# Patient Record
Sex: FEMALE | Employment: UNEMPLOYED
[De-identification: names, ages, dates, MRNs, and addresses within clinical notes are randomized per-mention and may not be internally consistent; named-entity substitution may affect disease eponyms.]

---

## 2023-08-15 ENCOUNTER — HOSPITAL ENCOUNTER (OUTPATIENT)
Facility: HOSPITAL | Age: 33
Discharge: HOME | End: 2023-08-18
Attending: ORTHOPAEDIC SURGERY
Payer: OTHER GOVERNMENT

## 2023-08-15 VITALS — WEIGHT: 180 LBS

## 2023-08-15 DIAGNOSIS — S89.92XA: ICD-10-CM

## 2023-08-15 DIAGNOSIS — S83.242A ACUTE MEDIAL MENISCUS TEAR OF LEFT KNEE, INITIAL ENCOUNTER: ICD-10-CM

## 2023-08-15 DIAGNOSIS — M25.562 ACUTE PAIN OF LEFT KNEE: ICD-10-CM

## 2023-08-15 PROCEDURE — 73721 MRI JNT OF LWR EXTRE W/O DYE: CPT

## 2024-05-09 ENCOUNTER — OFFICE VISIT (OUTPATIENT)
Age: 34
End: 2024-05-09
Payer: OTHER GOVERNMENT

## 2024-05-09 VITALS
BODY MASS INDEX: 33.77 KG/M2 | OXYGEN SATURATION: 99 % | RESPIRATION RATE: 16 BRPM | WEIGHT: 172 LBS | DIASTOLIC BLOOD PRESSURE: 82 MMHG | HEIGHT: 60 IN | HEART RATE: 67 BPM | SYSTOLIC BLOOD PRESSURE: 122 MMHG

## 2024-05-09 DIAGNOSIS — R31.0 GROSS HEMATURIA: ICD-10-CM

## 2024-05-09 DIAGNOSIS — N20.0 CALCIUM KIDNEY STONES: ICD-10-CM

## 2024-05-09 DIAGNOSIS — Z91.89 AT RISK OF UTI: Primary | ICD-10-CM

## 2024-05-09 LAB
BILIRUBIN, URINE, POC: NEGATIVE
BLOOD URINE, POC: NEGATIVE
GLUCOSE URINE, POC: NEGATIVE
KETONES, URINE, POC: NEGATIVE
LEUKOCYTE ESTERASE, URINE, POC: NEGATIVE
NITRITE, URINE, POC: NEGATIVE
PH, URINE, POC: 6 (ref 4.6–8)
PROTEIN,URINE, POC: NEGATIVE
SPECIFIC GRAVITY, URINE, POC: 1.01 (ref 1–1.03)
URINALYSIS CLARITY, POC: CLEAR
URINALYSIS COLOR, POC: YELLOW
UROBILINOGEN, POC: NORMAL

## 2024-05-09 PROCEDURE — 99204 OFFICE O/P NEW MOD 45 MIN: CPT | Performed by: UROLOGY

## 2024-05-09 PROCEDURE — 81003 URINALYSIS AUTO W/O SCOPE: CPT | Performed by: UROLOGY

## 2024-05-09 NOTE — PROGRESS NOTES
Chief Complaint   Patient presents with    Frequent/Recurrent UTI     1. Have you been to the ER, urgent care clinic since your last visit?  Hospitalized since your last visit?Yes When: 4/28/2024 [facility]    2. Have you seen or consulted any other health care providers outside of the [facility] since your last visit?  Include any pap smears or colon screening. No  /82 (Site: Left Upper Arm, Position: Sitting, Cuff Size: Medium Adult)   Pulse 67   Resp 16   Ht 1.53 m (5' 0.25\")   Wt 78 kg (172 lb)   SpO2 99%   BMI 33.31 kg/m²

## 2024-05-09 NOTE — ASSESSMENT & PLAN NOTE
Recurrent UTIs. H/o pyelonephritis and scarring.  She denies identifying triggers and think she is hydrating well.     We discussed evaluation with a cystogram, cysto and BRP. The patient was counseled on the risks, benefits and expected course of the procedure.  The patient wished to proceed.

## 2024-05-09 NOTE — PROGRESS NOTES
HISTORY OF PRESENT ILLNESS  Addie Powell is a 34 y.o. female.   has a past medical history of Anxiety, Depression, Fibromyalgia, and Vicenta Parkinson White pattern seen on electrocardiogram.  has a past surgical history that includes  section.  Chief Complaint   Patient presents with    Frequent/Recurrent UTI     She is a 34-year-old woman with history of recurrent kidney infection and UTIs.    Per her records she had a hospital visit 2020 for pyelonephritis, 2023 for UTI, 2023 for pyelonephritis, 2024 for UTI, 2024 for hematuria.  CT scan on 2023 revealed bilateral 2 mm nonobstructing calculi and renal cortical scarring and thinning bilaterally.  CT scan on 2024 revealed similar findings.    She has had problems 7 years, since her last child (#4).  She had severe preeclampsia.   She also had preeclampsia with her second child.     She does not recognize any triggers for UTIs.   She passed a lot of blood in the urine this past ER visit.  She did not have pain.      She no longer takes NSAIDs.     She was in the  4 years until  and was taking naproxen for years.  August she had a knee injury and stopped naproxen after that.  She can use Tylenol.  She has leftover hydrocodone from past surgeries.  She took that just twice.      She is not prone to easy bruising.      She is worried about cancer.  She is not a smoker.      Frequent/Recurrent UTI        1. At risk of UTI  Assessment & Plan:   Recurrent UTIs. H/o pyelonephritis and scarring.  She denies identifying triggers and think she is hydrating well.     We discussed evaluation with a cystogram, cysto and BRP. The patient was counseled on the risks, benefits and expected course of the procedure.  The patient wished to proceed.    Orders:  -     AMB POC URINALYSIS DIP STICK AUTO W/O MICRO  2. Gross hematuria  Assessment & Plan:   Resolved. She had no pain.  CT without acute problems.  We discussed evaluation

## 2024-05-09 NOTE — ASSESSMENT & PLAN NOTE
Resolved. She had no pain.  CT without acute problems.  We discussed evaluation with cystoscopy, cystogram/ BRP.  She wishes to proceed.

## 2024-06-11 ENCOUNTER — TELEMEDICINE (OUTPATIENT)
Age: 34
End: 2024-06-11
Payer: OTHER GOVERNMENT

## 2024-06-11 DIAGNOSIS — Z91.89 AT RISK OF UTI: Primary | ICD-10-CM

## 2024-06-11 DIAGNOSIS — R31.0 GROSS HEMATURIA: ICD-10-CM

## 2024-06-11 DIAGNOSIS — N20.0 CALCIUM KIDNEY STONES: ICD-10-CM

## 2024-06-11 PROCEDURE — 99213 OFFICE O/P EST LOW 20 MIN: CPT | Performed by: UROLOGY

## 2024-06-11 NOTE — ASSESSMENT & PLAN NOTE
We discussed prevention.  She had 4-5 UTIs the past year.  We discussed hydration.  We will try to avoid prophylactic antibiotics.  We discussed natural supplements like cranberry juice and cranberry capsules.  She can also explore d-mannose.  She is given information for [employer] supplements.    She is instructed to call if she has further problems.

## 2024-06-11 NOTE — PROGRESS NOTES
HISTORY OF PRESENT ILLNESS  Addie Powell is a 34 y.o. female.   has a past medical history of Anxiety, Depression, Fibromyalgia, and Vicenta Parkinson White pattern seen on electrocardiogram.  has a past surgical history that includes  section.  Chief Complaint   Patient presents with    Follow-up     cystoscopy      Addie Powell, was evaluated through a synchronous (real-time) audio-video encounter. The patient (or guardian if applicable) is aware that this is a billable service, which includes applicable co-pays. This Virtual Visit was conducted with patient's (and/or legal guardian's) consent. The visit was conducted pursuant to the emergency declaration under the Nixon Act and the National Emergencies Act, 1135 waiver authority and the Coronavirus Preparedness and Response Supplemental Appropriations Act. Patient identification was verified, and a caregiver was present when appropriate. The patient was located in a state where the provider was licensed to provide care.     She is a 34-year-old woman with history of recurrent kidney infection and UTIs.     Per her records she had a hospital visit 2020 for pyelonephritis, 2023 for UTI, 2023 for pyelonephritis, 2024 for UTI, 2024 for hematuria.  CT scan on 2023 revealed bilateral 2 mm nonobstructing calculi and renal cortical scarring and thinning bilaterally.  CT scan on 2024 revealed similar findings.     She has had problems 7 years, since her last child (#4).  She had severe preeclampsia.   She also had preeclampsia with her second child.     She does not recognize any triggers for UTIs.   She passed a lot of blood in the urine this past ER visit.  She did not have pain.       She no longer takes NSAIDs.      She was in the  4 years until  and was taking naproxen for years.  August she had a knee injury and stopped naproxen after that.  She can use Tylenol.  She has leftover hydrocodone from past

## 2024-06-11 NOTE — PROGRESS NOTES
Chief Complaint   Patient presents with    Follow-up     cystoscopy     1. Have you been to the ER, urgent care clinic since your last visit?  Hospitalized since your last visit?No    2. Have you seen or consulted any other health care providers outside of the [facility] since your last visit?  Include any pap smears or colon screening. No